# Patient Record
Sex: FEMALE
[De-identification: names, ages, dates, MRNs, and addresses within clinical notes are randomized per-mention and may not be internally consistent; named-entity substitution may affect disease eponyms.]

---

## 2019-02-04 ENCOUNTER — HOSPITAL ENCOUNTER (EMERGENCY)
Dept: HOSPITAL 31 - C.ER | Age: 29
LOS: 1 days | Discharge: HOME | End: 2019-02-05
Payer: COMMERCIAL

## 2019-02-04 VITALS — RESPIRATION RATE: 18 BRPM

## 2019-02-04 DIAGNOSIS — T23.101A: Primary | ICD-10-CM
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
## 2019-02-05 VITALS — TEMPERATURE: 97.9 F | HEART RATE: 80 BPM | DIASTOLIC BLOOD PRESSURE: 82 MMHG | SYSTOLIC BLOOD PRESSURE: 136 MMHG

## 2019-02-05 VITALS — OXYGEN SATURATION: 97 %
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
